# Patient Record
Sex: MALE | Race: WHITE | NOT HISPANIC OR LATINO | Employment: FULL TIME | URBAN - METROPOLITAN AREA
[De-identification: names, ages, dates, MRNs, and addresses within clinical notes are randomized per-mention and may not be internally consistent; named-entity substitution may affect disease eponyms.]

---

## 2021-04-12 ENCOUNTER — HOSPITAL ENCOUNTER (EMERGENCY)
Facility: HOSPITAL | Age: 61
End: 2021-04-13
Attending: EMERGENCY MEDICINE | Admitting: EMERGENCY MEDICINE
Payer: COMMERCIAL

## 2021-04-12 DIAGNOSIS — F32.A DEPRESSION: Primary | ICD-10-CM

## 2021-04-12 PROBLEM — F41.9 ANXIETY DISORDER, UNSPECIFIED: Status: ACTIVE | Noted: 2021-04-12

## 2021-04-12 LAB
ALBUMIN SERPL BCP-MCNC: 3.9 G/DL (ref 3–5.2)
ALP SERPL-CCNC: 38 U/L (ref 43–122)
ALT SERPL W P-5'-P-CCNC: 13 U/L
AMPHETAMINES SERPL QL SCN: NEGATIVE
ANION GAP SERPL CALCULATED.3IONS-SCNC: 5 MMOL/L (ref 5–14)
AST SERPL W P-5'-P-CCNC: 23 U/L (ref 17–59)
ATRIAL RATE: 66 BPM
BARBITURATES UR QL: NEGATIVE
BASOPHILS # BLD AUTO: 0 THOUSANDS/ΜL (ref 0–0.1)
BASOPHILS NFR BLD AUTO: 0 % (ref 0–1)
BENZODIAZ UR QL: NEGATIVE
BILIRUB SERPL-MCNC: 0.94 MG/DL
BUN SERPL-MCNC: 18 MG/DL (ref 5–25)
CALCIUM SERPL-MCNC: 9.2 MG/DL (ref 8.4–10.2)
CHLORIDE SERPL-SCNC: 104 MMOL/L (ref 97–108)
CO2 SERPL-SCNC: 30 MMOL/L (ref 22–30)
COCAINE UR QL: NEGATIVE
CREAT SERPL-MCNC: 0.72 MG/DL (ref 0.7–1.5)
EOSINOPHIL # BLD AUTO: 0 THOUSAND/ΜL (ref 0–0.4)
EOSINOPHIL NFR BLD AUTO: 1 % (ref 0–6)
ERYTHROCYTE [DISTWIDTH] IN BLOOD BY AUTOMATED COUNT: 12.6 %
ETHANOL EXG-MCNC: 0 MG/DL
FLUAV RNA RESP QL NAA+PROBE: NEGATIVE
FLUBV RNA RESP QL NAA+PROBE: NEGATIVE
GFR SERPL CREATININE-BSD FRML MDRD: 100 ML/MIN/1.73SQ M
GLUCOSE SERPL-MCNC: 100 MG/DL (ref 70–99)
HCT VFR BLD AUTO: 42.2 % (ref 41–53)
HGB BLD-MCNC: 14.2 G/DL (ref 13.5–17.5)
LYMPHOCYTES # BLD AUTO: 0.7 THOUSANDS/ΜL (ref 0.5–4)
LYMPHOCYTES NFR BLD AUTO: 15 % (ref 25–45)
MCH RBC QN AUTO: 33 PG (ref 26–34)
MCHC RBC AUTO-ENTMCNC: 33.6 G/DL (ref 31–36)
MCV RBC AUTO: 98 FL (ref 80–100)
METHADONE UR QL: NEGATIVE
MONOCYTES # BLD AUTO: 0.3 THOUSAND/ΜL (ref 0.2–0.9)
MONOCYTES NFR BLD AUTO: 6 % (ref 1–10)
NEUTROPHILS # BLD AUTO: 3.5 THOUSANDS/ΜL (ref 1.8–7.8)
NEUTS SEG NFR BLD AUTO: 78 % (ref 45–65)
OPIATES UR QL SCN: NEGATIVE
OXYCODONE+OXYMORPHONE UR QL SCN: NEGATIVE
P AXIS: 59 DEGREES
PCP UR QL: NEGATIVE
PLATELET # BLD AUTO: 222 THOUSANDS/UL (ref 150–450)
PMV BLD AUTO: 8.3 FL (ref 8.9–12.7)
POTASSIUM SERPL-SCNC: 3.9 MMOL/L (ref 3.6–5)
PR INTERVAL: 150 MS
PROT SERPL-MCNC: 6.6 G/DL (ref 5.9–8.4)
QRS AXIS: -49 DEGREES
QRSD INTERVAL: 94 MS
QT INTERVAL: 390 MS
QTC INTERVAL: 408 MS
RBC # BLD AUTO: 4.29 MILLION/UL (ref 4.5–5.9)
RSV RNA RESP QL NAA+PROBE: NEGATIVE
SARS-COV-2 RNA RESP QL NAA+PROBE: NEGATIVE
SODIUM SERPL-SCNC: 139 MMOL/L (ref 137–147)
T WAVE AXIS: -13 DEGREES
THC UR QL: NEGATIVE
TSH SERPL DL<=0.05 MIU/L-ACNC: 0.65 UIU/ML (ref 0.47–4.68)
VENTRICULAR RATE: 66 BPM
WBC # BLD AUTO: 4.5 THOUSAND/UL (ref 4.5–11)

## 2021-04-12 PROCEDURE — 93005 ELECTROCARDIOGRAM TRACING: CPT

## 2021-04-12 PROCEDURE — 82075 ASSAY OF BREATH ETHANOL: CPT | Performed by: EMERGENCY MEDICINE

## 2021-04-12 PROCEDURE — 80307 DRUG TEST PRSMV CHEM ANLYZR: CPT | Performed by: EMERGENCY MEDICINE

## 2021-04-12 PROCEDURE — 99285 EMERGENCY DEPT VISIT HI MDM: CPT

## 2021-04-12 PROCEDURE — 84443 ASSAY THYROID STIM HORMONE: CPT | Performed by: EMERGENCY MEDICINE

## 2021-04-12 PROCEDURE — 99285 EMERGENCY DEPT VISIT HI MDM: CPT | Performed by: EMERGENCY MEDICINE

## 2021-04-12 PROCEDURE — 80053 COMPREHEN METABOLIC PANEL: CPT | Performed by: EMERGENCY MEDICINE

## 2021-04-12 PROCEDURE — 93010 ELECTROCARDIOGRAM REPORT: CPT | Performed by: INTERNAL MEDICINE

## 2021-04-12 PROCEDURE — 99243 OFF/OP CNSLTJ NEW/EST LOW 30: CPT | Performed by: PSYCHIATRY & NEUROLOGY

## 2021-04-12 PROCEDURE — 85025 COMPLETE CBC W/AUTO DIFF WBC: CPT | Performed by: EMERGENCY MEDICINE

## 2021-04-12 PROCEDURE — 0241U HB NFCT DS VIR RESP RNA 4 TRGT: CPT | Performed by: EMERGENCY MEDICINE

## 2021-04-12 PROCEDURE — 36415 COLL VENOUS BLD VENIPUNCTURE: CPT | Performed by: EMERGENCY MEDICINE

## 2021-04-12 RX ORDER — SIMVASTATIN 40 MG
40 TABLET ORAL
Status: DISCONTINUED | OUTPATIENT
Start: 2021-04-12 | End: 2021-04-12

## 2021-04-12 RX ORDER — DIPHENHYDRAMINE HCL 25 MG
25 TABLET ORAL ONCE
Status: COMPLETED | OUTPATIENT
Start: 2021-04-12 | End: 2021-04-13

## 2021-04-12 RX ORDER — ATORVASTATIN CALCIUM 20 MG/1
20 TABLET, FILM COATED ORAL
Status: DISCONTINUED | OUTPATIENT
Start: 2021-04-13 | End: 2021-04-12

## 2021-04-12 RX ORDER — LISINOPRIL 10 MG/1
10 TABLET ORAL DAILY
Status: DISCONTINUED | OUTPATIENT
Start: 2021-04-12 | End: 2021-04-12

## 2021-04-12 RX ORDER — LISINOPRIL 10 MG/1
10 TABLET ORAL DAILY
COMMUNITY
Start: 2021-03-30

## 2021-04-12 RX ORDER — HYDROXYZINE HYDROCHLORIDE 25 MG/1
50 TABLET, FILM COATED ORAL ONCE
Status: DISCONTINUED | OUTPATIENT
Start: 2021-04-12 | End: 2021-04-12

## 2021-04-12 RX ORDER — LISINOPRIL 10 MG/1
10 TABLET ORAL DAILY
Status: DISCONTINUED | OUTPATIENT
Start: 2021-04-12 | End: 2021-04-13 | Stop reason: HOSPADM

## 2021-04-12 RX ORDER — LISINOPRIL 10 MG/1
10 TABLET ORAL DAILY
Status: DISCONTINUED | OUTPATIENT
Start: 2021-04-13 | End: 2021-04-12

## 2021-04-12 RX ORDER — SIMVASTATIN 40 MG
40 TABLET ORAL
COMMUNITY
Start: 2021-03-30 | End: 2022-03-30

## 2021-04-12 RX ORDER — LANOLIN ALCOHOL/MO/W.PET/CERES
6 CREAM (GRAM) TOPICAL
Status: DISCONTINUED | OUTPATIENT
Start: 2021-04-12 | End: 2021-04-13 | Stop reason: HOSPADM

## 2021-04-12 RX ORDER — ATORVASTATIN CALCIUM 20 MG/1
20 TABLET, FILM COATED ORAL ONCE
Status: COMPLETED | OUTPATIENT
Start: 2021-04-12 | End: 2021-04-12

## 2021-04-12 RX ORDER — SIMVASTATIN 40 MG
40 TABLET ORAL
Status: DISCONTINUED | OUTPATIENT
Start: 2021-04-13 | End: 2021-04-12

## 2021-04-12 RX ORDER — HYDROXYZINE HYDROCHLORIDE 25 MG/1
25 TABLET, FILM COATED ORAL EVERY 6 HOURS PRN
Status: DISCONTINUED | OUTPATIENT
Start: 2021-04-12 | End: 2021-04-13 | Stop reason: HOSPADM

## 2021-04-12 RX ORDER — ASPIRIN 81 MG/1
81 TABLET ORAL DAILY
Status: DISCONTINUED | OUTPATIENT
Start: 2021-04-13 | End: 2021-04-13 | Stop reason: HOSPADM

## 2021-04-12 RX ORDER — LORAZEPAM 0.5 MG/1
0.5 TABLET ORAL EVERY 6 HOURS PRN
Status: DISCONTINUED | OUTPATIENT
Start: 2021-04-12 | End: 2021-04-13 | Stop reason: HOSPADM

## 2021-04-12 RX ORDER — HYDROXYZINE HYDROCHLORIDE 25 MG/1
50 TABLET, FILM COATED ORAL
COMMUNITY

## 2021-04-12 RX ADMIN — ATORVASTATIN CALCIUM 20 MG: 20 TABLET, FILM COATED ORAL at 20:41

## 2021-04-12 RX ADMIN — LORAZEPAM 0.5 MG: 0.5 TABLET ORAL at 19:41

## 2021-04-12 NOTE — ED NOTES
Patients girlfriend went home, requested updates during his stay   Pt laying in bed quietly     Lyn Martinez RN  04/12/21 6333

## 2021-04-12 NOTE — ED NOTES
Patient brought into the ED by his wife  Per wife patient has not been acting himself over the past 3 months  She also states patient's work was concerned of the same  Per wife patient not as talkative, has been concerned about "everything"  States he is worried about his elderly father passing, follows his wife around the house, worried about work  Patient won't specify, questioning everything this RN is doing or explaining to him  Patient fixated on his signature and how it was "too big" for the line provided during his belongings  Patient will not keep his hands still, appears to be restless  Denies SI/HI/AH/VH  Patient was on Trazodone 150mg HS for sleep but this was stopped and changed approx  3 weeks ago  Patient now prescribed Hydroxyzine 50mg HS PRN for sleep  Per wife patient has been taking this every night, but it is not helping  States he is restless and not sleeping, today has been up since around 1am  Patient is cooperative  Q15min checks in place  Patient near nurses station               [facility]  04/12/21 1855

## 2021-04-12 NOTE — ED NOTES
There are no beds in network, [facility], [facility], [facility], Yolanda TELLEZ Tonyberg, Ozzie cavazoser, Charlie, Natan  [address] requested clinical be faxed for review

## 2021-04-12 NOTE — ED NOTES
Patient is a 57 yr old male brought to the Ed due to extreme anxiety, depression, excessive worry which is interferring with functioning at home and at work, paranoia, and inablility to sleep  Patient has no prior hx prior to about 3 months ago  Now he is having periods where he is unable to sleep, is up all night, is very restless, hitting or scratching his arms, rocking or pacing  He is fearful that his family is ok  mood is depressed, anxious  He is having difficulty functioning  Unable to identify a reason why the symptoms escalated about 3 mon  ago  Patient is ok with inpatient admission to improve his symptoms and a 201 was signed  Patient is cooperative in the ED, but is very anxious and is afraid to be alone, dosen't want his girlfriend to leave  Patient has no prior hx of inpatient treatment  He does not have a psychiatrist, and his PCP has tried to manage his symptoms       Josafat SULLIVAN

## 2021-04-12 NOTE — ED NOTES
COVID results faxed to [facility] per request      Benitez Franklin, NATE  04/12/21 1956

## 2021-04-12 NOTE — CONSULTS
Psychiatry Consultation Note   Dipika Almonte 57 y o  male MRN: [id number]  Unit/Bed#: ED 09 Encounter: [id number]    Assessment and Plan     Assessment:    Dipika Almonte is a 57 y o  male with no psychiatric history who is presenting with severe anxiety, inability to sleep, paranoia, and restlessness  Principal Problem:  1  Anxiety Disorder, unspecified ([address] )  a  Differential: Generalized Anxiety Disorder, Panic Disorder, Major Depressive Disorder    Active Problems:  1  Insomnia  2  Hypertension    Plan:   Risks, benefits and possible side effects of Medications:   Risks, benefits, and possible side effects of medications explained to patient and patient verbalizes understanding  1  Patient would benefit from voluntary inpatient admission for severe anxiety, paranoia,  Insomnia, and inability to care for self  201 form signed by patient  Crisis worker to begin bed search  2    Recommend following medications:   Atarax 25 mg Q6H PRN for mild anxiety; Ativan 0 5 mg Q6H PRN for moderate-severe anxiety;  3    Psychiatric Crisis ED Consultation Service will continue to see the patient daily while he is in the ED  Please reach out to us via [place] Text with any questions      HPI     Chief Complaint: "I am anxious"    History of Present Illness   Physician Requesting Consult: Elen Taylor DO  Reason for Consult / Principal Problem:     Dipika Almonte is a 57 y  o white employed male with no known psychiatric history who is brought by his girlfriend with severe anxiety, restlessness, paranoia, and inability to sleep  Of note, 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 which required 5-day inpatient stay and 2-months of IV antibiotics  Patient reports that for the past 3 months, he has suffered from poor sleep, constant worrying about multiple things in his life, and not able to properly take care of himself   Patient and girlfriend report that he has been sleeping approximately two days out of 7 days for the past 3 months  Whenever he sleeps, he gets approximately 2-4 hours and occasionally 7 hours of sleep  Patient reports that he experiences constant anxiety about "everything"  At baseline, his anxiety is approximately 7/10 in severity on a daily basis  He is worrying about work, worried about his father or girlfriend dying, and worries about how he is going to fall asleep  His worries keep him up at night and patient's boss at work has noticed that he has been making mistakes at work as well  Patient's girlfriend noted that since his anxiety symptoms started, he has been involuntarily losing his weight  She notes that for the past 2 5 weeks, patient lost approximately 10 lbs of weight even though he has had good appetite and has been eating the same amount of food daily  Furthermore, patient and his girlfriend noted that he has been extremely fidgety, constantly scratching his arms, tremors, twirling his hair, rocking, and pacing at home  These symptoms are worsened with increase in anxiety  Girlfriend notes that he has become fearful of staying by himself and wants to be constantly around her  Denies panic attacks, nightmares, flashbacks, SOB, palpitations, diaphoresis, diarrhea, nausea/vomiting, cold/heat sensitivity, headaches  On further assessment, patient reports his mood as "depressed and anxious"  He reports that in addition to sleeping difficulty, he has been experiencing anhedonia, hopelessness, decreased energy, concentration, psychomotor slowing and periodically agitation  These symptoms have been occurring for the past 3 months as well  Denies SI/HI/AVH  Denies manic/hypomanic symptoms recently or in the past  Denies use of illicit substances  Denies use of caffeine  He reports that he feels safe at home and at work and that nobody is after him   However, he notes that he has been more alert about his surroundings and sometimes he looks at strangers several times because they look suspicious  Psychiatric ROS and PMH     Psychiatric Review Of Systems:    sleep: no  appetite changes: no  weight changes: yes, 10 lbs for the last 2 5 week  energy/anergy: yes  interest/pleasure/anhedonia: no  somatic symptoms: yes  anxiety/panic: no  melba: no  guilty/hopeless: yes  self injurious behavior/risky behavior: no    Historical Information   Past Psychiatric History:    Inpatient: no prior inpatient psychiatric admissions     Outpatient: Has seen PCP for insomnia     Current Psychiatrist: does not have  Has seen a psychiatrist once as a teenager because "I thought I was espinoza, but apparently I was not  I love women"  Did not wish to elaborate further  Past Suicide attempts: denies  Past Violent behavior: denies  Past Psychiatric medication trial: Trazodone, Atarax    Substance Abuse History:  Social History     Tobacco History     Smoking Status  Former Smoker    Smokeless Tobacco Use  Never Used          Alcohol History     Alcohol Use Status  Not Currently          Drug Use     Drug Use Status  Not Asked          Sexual Activity     Sexually Active  Not Asked          Activities of Daily Living    Not Asked                 I have assessed this patient for substance use within the past 12 months    History of IP/OP rehabilitation program: denies  Smoking history: quit 9 years ago  Used to smoke 1 ppd for 39 years  Family Psychiatric History:   No known psychiatric history    Social History:  Education: high school diploma/GED  Learning Disabilities: denies  Marital history: single  Living arrangement, social support: patient lives at home with his girfriend and girlfriend's sister  Reports good support from both  No conflict at home  Occupational History: works at a [employer]  Functioning Relationships: good support system and good relationship with spouse or significant other     Other Pertinent History: no legal, traumatic, or  history    Traumatic History:     Abuse: denies  Other Traumatic Events: death of sister from cancer many years ago  Past Medical History:   Diagnosis Date    High cholesterol     Hypertension     Insomnia     Lyme disease        Mental Status Evaluation and Medical ROS     Medical Review Of Systems:  Review of Systems - Negative except as mentioned in HPI    Meds/Allergies   all current active meds have been reviewed, current meds:   Current Facility-Administered Medications   Medication Dose Route Frequency    hydrOXYzine HCL (ATARAX) tablet 25 mg  25 mg Oral Q6H PRN    LORazepam (ATIVAN) tablet 0 5 mg  0 5 mg Oral Q6H PRN    and PTA meds:   Prior to Admission Medications   Prescriptions Last Dose Informant Patient Reported? Taking?   hydrOXYzine HCL (ATARAX) 25 mg tablet 4/11/2021 at Unknown time Spouse/Significant Other Yes Yes   Sig: Take 50 mg by mouth daily at bedtime as needed for itching   lisinopril (ZESTRIL) 10 mg tablet 4/12/2021 at Unknown time  Yes Yes   Sig: Take 10 mg by mouth daily   simvastatin (ZOCOR) 40 mg tablet 4/11/2021 at Unknown time  Yes Yes   Sig: Take 40 mg by mouth daily with dinner      Facility-Administered Medications: None     No Known Allergies    Objective   Vital signs in last 24 hours:  Temp:  [97 5 °F (36 4 °C)] 97 5 °F (36 4 °C)  HR:  [80] 80  Resp:  [18] 18  BP: (148)/(76) 148/76    No intake or output data in the 24 hours ending 04/12/21 1216    Mental Status Evaluation:  Appearance:  age appropriate, tired appearing white male, dressed in hospital gown  NAD  Sitting up on the bed  Behavior:  restless and fidgety, hypervigilant  Otherwise cooperative with interview     Speech:  spontaneous with normal rate and volume   Mood:  Depressed and anxious   Affect:  constricted   Language: naming objects   Thought Process:  goal directed   Associations: intact associations   Thought Content:  no delusions or obsessions   Perceptual Disturbances: No AVH  Does not appear to be responding to internal stimuli  Does not appear distracted  Risk Potential: Suicidal Ideations none, Homicidal Ideations none and Potential for Aggression No   Sensorium:  person, place, time/date and situation   Memory:  recent and remote memory grossly intact   Cognition:  recent and remote memory grossly intact   Consciousness:  alert and awake    Attention: attention span appeared shorter than expected for age   Intellect: not examined   Fund of Knowledge: awareness of current events: Current President (Jennifer), Coronavirus pandemic   Insight:  limited   Judgment: limited   Muscle Strength and Tone: WNL   Gait/Station: normal gait/station   Motor Activity: no abnormal movements     Lab Results:   I have personally reviewed all pertinent laboratory/tests results    Most Recent Labs:   Lab Results   Component Value Date    WBC 4 50 04/12/2021    RBC 4 29 (L) 04/12/2021    HGB 14 2 04/12/2021    HCT 42 2 04/12/2021     04/12/2021    RDW 12 6 04/12/2021    NEUTROABS 3 50 04/12/2021    SODIUM 139 04/12/2021    K 3 9 04/12/2021     04/12/2021    CO2 30 04/12/2021    BUN 18 04/12/2021    CREATININE 0 72 04/12/2021    GLUC 100 (H) 04/12/2021    CALCIUM 9 2 04/12/2021    AST 23 04/12/2021    ALT 13 04/12/2021    ALKPHOS 38 (L) 04/12/2021    TP 6 6 04/12/2021    ALB 3 9 04/12/2021    TBILI 0 94 04/12/2021    ACM9OKBMTEIN 0 650 04/12/2021     Drug Screen:   Lab Results   Component Value Date    AMPMETHUR Negative 04/12/2021    BARBTUR Negative 04/12/2021    BDZUR Negative 04/12/2021    THCUR Negative 04/12/2021    COCAINEUR Negative 04/12/2021    METHADONEUR Negative 04/12/2021    OPIATEUR Negative 04/12/2021    PCPUR Negative 04/12/2021         Millie Stanton MD    Psychiatry Resident, PGY-I

## 2021-04-12 NOTE — ED NOTES
Patient continued to pace in his room for 20 min and continuously ask this RN if his girlfriend is here  Let pt know she is speaking w/ staff  Gave pt water   Girlfriend now arrived at bedside w/ patient     Alistair Whelan RN  04/12/21 5720

## 2021-04-12 NOTE — ED NOTES
Psych resident at bedside speaking with patient and patients wife        [place]  04/12/21 7377

## 2021-04-12 NOTE — ED NOTES
pts girlfriend at bedside, pt given a blanket and oriented to television remote     Robson Dominguez RN  04/12/21 0517

## 2021-04-12 NOTE — ED PROVIDER NOTES
History  Chief Complaint   Patient presents with    Psychiatric Evaluation     sig other states about 2 weeks of strange paranoid/worried behavior  states doesnt sleep   "always thinking of death" but states doesnt want to die     Patient is a 68-year-old male who has a history of hypertension high cholesterol insomnia and Lyme disease  Presents for concerns of worsening depression and paranoid behavior  Presents with his fiancee  Patient states he has been having at 1st difficulty sleeping where he would only sleep once or twice a week  Now having hypersomnia  Also having paranoid behavior where he is following his fiancee around the house  He is also having auditory and visual hallucinations were telling him to go and breathe on his father  He is denying any suicidal homicidal ideations  But he is concerned about his behavior  Prior to Admission Medications   Prescriptions Last Dose Informant Patient Reported? Taking?   hydrOXYzine HCL (ATARAX) 25 mg tablet 4/11/2021 at Unknown time Spouse/Significant Other Yes Yes   Sig: Take 50 mg by mouth daily at bedtime as needed for itching   lisinopril (ZESTRIL) 10 mg tablet 4/12/2021 at Unknown time  Yes Yes   Sig: Take 10 mg by mouth daily   simvastatin (ZOCOR) 40 mg tablet 4/11/2021 at Unknown time  Yes Yes   Sig: Take 40 mg by mouth daily with dinner      Facility-Administered Medications: None       Past Medical History:   Diagnosis Date    High cholesterol     Hypertension     Insomnia     Lyme disease        Past Surgical History:   Procedure Laterality Date    KNEE SURGERY         History reviewed  No pertinent family history  I have reviewed and agree with the history as documented      E-Cigarette/Vaping     E-Cigarette/Vaping Substances     Social History     Tobacco Use    Smoking status: Former Smoker    Smokeless tobacco: Never Used   Substance Use Topics    Alcohol use: Not Currently    Drug use: Not on file       Review of Systems Constitutional: Negative  Negative for chills and fever  HENT: Negative  Negative for rhinorrhea, sore throat, trouble swallowing and voice change  Eyes: Negative  Negative for pain and visual disturbance  Respiratory: Negative  Negative for cough, shortness of breath and wheezing  Cardiovascular: Negative  Negative for chest pain and palpitations  Gastrointestinal: Negative for abdominal pain, diarrhea, nausea and vomiting  Genitourinary: Negative  Negative for dysuria and frequency  Musculoskeletal: Negative  Negative for neck pain and neck stiffness  Skin: Negative  Negative for rash  Neurological: Negative  Negative for dizziness, speech difficulty, weakness, light-headedness and numbness  Psychiatric/Behavioral: Positive for hallucinations  Negative for suicidal ideas  Physical Exam  Physical Exam  Vitals signs and nursing note reviewed  Constitutional:       General: He is not in acute distress  Appearance: He is well-developed  HENT:      Head: Normocephalic and atraumatic  Eyes:      Conjunctiva/sclera: Conjunctivae normal       Pupils: Pupils are equal, round, and reactive to light  Neck:      Musculoskeletal: Normal range of motion and neck supple  Trachea: No tracheal deviation  Cardiovascular:      Rate and Rhythm: Normal rate and regular rhythm  Pulmonary:      Effort: Pulmonary effort is normal  No respiratory distress  Breath sounds: Normal breath sounds  No wheezing or rales  Abdominal:      General: Bowel sounds are normal  There is no distension  Palpations: Abdomen is soft  Tenderness: There is no abdominal tenderness  There is no guarding or rebound  Musculoskeletal: Normal range of motion  General: No tenderness or deformity  Skin:     General: Skin is warm and dry  Capillary Refill: Capillary refill takes less than 2 seconds  Findings: No rash     Neurological:      Mental Status: He is alert and oriented to person, place, and time  GCS: GCS eye subscore is 4  GCS verbal subscore is 5  GCS motor subscore is 6  Cranial Nerves: Cranial nerves are intact  Sensory: Sensation is intact  Motor: Motor function is intact  Coordination: Coordination is intact  Gait: Gait is intact  Psychiatric:         Attention and Perception: Attention normal          Mood and Affect: Affect is flat  Speech: He is noncommunicative  Behavior: Behavior is withdrawn  Behavior is cooperative  Thought Content: Thought content is paranoid  Thought content is not delusional  Thought content does not include homicidal or suicidal ideation  Vital Signs  ED Triage Vitals [04/12/21 0849]   Temperature Pulse Respirations Blood Pressure SpO2   97 5 °F (36 4 °C) 80 18 148/76 99 %      Temp Source Heart Rate Source Patient Position - Orthostatic VS BP Location FiO2 (%)   Tympanic Monitor Sitting Left arm --      Pain Score       --           Vitals:    04/12/21 0849   BP: 148/76   Pulse: 80   Patient Position - Orthostatic VS: Sitting         Visual Acuity      ED Medications  Medications   hydrOXYzine HCL (ATARAX) tablet 25 mg (has no administration in time range)   LORazepam (ATIVAN) tablet 0 5 mg (has no administration in time range)       Diagnostic Studies  Results Reviewed     Procedure Component Value Units Date/Time    TSH [[id number]]  (Normal) Collected: 04/12/21 1008    Lab Status: Final result Specimen: Blood from Arm, Left Updated: 04/12/21 1106     TSH 3RD GENERATON 0 650 uIU/mL     Narrative:      Patients undergoing fluorescein dye angiography may retain small amounts of fluorescein in the body for 48-72 hours post procedure  Samples containing fluorescein can produce falsely depressed TSH values  If the patient had this procedure,a specimen should be resubmitted post fluorescein clearance        Rapid drug screen, urine [[id number]]  (Normal) Collected: 04/12/21 1008 Lab Status: Final result Specimen: Urine, Clean Catch Updated: 04/12/21 1043     Amph/Meth UR Negative     Barbiturate Ur Negative     Benzodiazepine Urine Negative     Cocaine Urine Negative     Methadone Urine Negative     Opiate Urine Negative     PCP Ur Negative     THC Urine Negative     Oxycodone Urine Negative    Narrative:      FOR MEDICAL PURPOSES ONLY  IF CONFIRMATION NEEDED PLEASE CONTACT THE LAB WITHIN 5 DAYS      Drug Screen Cutoff Levels:  AMPHETAMINE/METHAMPHETAMINES  1000 ng/mL  BARBITURATES     200 ng/mL  BENZODIAZEPINES     200 ng/mL  COCAINE      300 ng/mL  METHADONE      300 ng/mL  OPIATES      300 ng/mL  PHENCYCLIDINE     25 ng/mL  THC       50 ng/mL  OXYCODONE      100 ng/mL    Comprehensive metabolic panel [[id number]]  (Abnormal) Collected: 04/12/21 1008    Lab Status: Final result Specimen: Blood from Arm, Left Updated: 04/12/21 1037     Sodium 139 mmol/L      Potassium 3 9 mmol/L      Chloride 104 mmol/L      CO2 30 mmol/L      ANION GAP 5 mmol/L      BUN 18 mg/dL      Creatinine 0 72 mg/dL      Glucose 100 mg/dL      Calcium 9 2 mg/dL      AST 23 U/L      ALT 13 U/L      Alkaline Phosphatase 38 U/L      Total Protein 6 6 g/dL      Albumin 3 9 g/dL      Total Bilirubin 0 94 mg/dL      eGFR 100 ml/min/1 73sq m     Narrative:      National Kidney Disease Foundation guidelines for Chronic Kidney Disease (CKD):     Stage 1 with normal or high GFR (GFR > 90 mL/min/1 73 square meters)    Stage 2 Mild CKD (GFR = 60-89 mL/min/1 73 square meters)    Stage 3A Moderate CKD (GFR = 45-59 mL/min/1 73 square meters)    Stage 3B Moderate CKD (GFR = 30-44 mL/min/1 73 square meters)    Stage 4 Severe CKD (GFR = 15-29 mL/min/1 73 square meters)    Stage 5 End Stage CKD (GFR <15 mL/min/1 73 square meters)  Note: GFR calculation is accurate only with a steady state creatinine    CBC and differential [[id number]]  (Abnormal) Collected: 04/12/21 1008    Lab Status: Final result Specimen: Blood from Arm, Left Updated: 04/12/21 1029     WBC 4 50 Thousand/uL      RBC 4 29 Million/uL      Hemoglobin 14 2 g/dL      Hematocrit 42 2 %      MCV 98 fL      MCH 33 0 pg      MCHC 33 6 g/dL      RDW 12 6 %      MPV 8 3 fL      Platelets 015 Thousands/uL      Neutrophils Relative 78 %      Lymphocytes Relative 15 %      Monocytes Relative 6 %      Eosinophils Relative 1 %      Basophils Relative 0 %      Neutrophils Absolute 3 50 Thousands/µL      Lymphocytes Absolute 0 70 Thousands/µL      Monocytes Absolute 0 30 Thousand/µL      Eosinophils Absolute 0 00 Thousand/µL      Basophils Absolute 0 00 Thousands/µL     POCT alcohol breath test [[id number]]  (Normal) Resulted: 04/12/21 1007    Lab Status: Final result Updated: 04/12/21 1007     EXTBreath Alcohol 0 000                 No orders to display              Procedures  Procedures         ED Course  ED Course as of Apr 12 1405   Mon Apr 12, 2021   1028 Procedure Note: EKG  Date/Time: 04/12/21 10:28 AM   Performed by: Beka Jones  Authorized by: Beka Jones  ECG interpreted by me, the ED Provider: yes   The EKG demonstrates:  Rate 66  Rhythm sinus  QTc 408  No ST elevations/depressions                                    SBIRT 22yo+      Most Recent Value   SBIRT (25 yo +)   In order to provide better care to our patients, we are screening all of our patients for alcohol and drug use  Would it be okay to ask you these screening questions? No Filed at: 04/12/2021 1043                    MDM  Number of Diagnoses or Management Options  Depression:   Diagnosis management comments: Patient agreeable for further treatment evaluation as inpatient  Patient has been medically cleared by me  Two hundred one signed completed  Patient is stable for admission for psychiatric treatment        Disposition  Final diagnoses:   Depression     Time reflects when diagnosis was documented in both MDM as applicable and the Disposition within this note     Time User Action Codes Description Comment 4/12/2021  9:51 AM Dora Quivers Add [F32 9] Depression       ED Disposition     ED Disposition Condition Date/Time Comment    Transfer to [facility] Apr 12, 2021 ZANE Phelps 52 should be transferred out to [facility] and has been medically cleared  MD Documentation      Most Recent Value   Sending MD Dr Lillian Pagan    None         Patient's Medications   Discharge Prescriptions    No medications on file     No discharge procedures on file      PDMP Review     None          ED Provider  Electronically Signed by           Nevin Freeman DO  04/12/21 1703

## 2021-04-12 NOTE — ED NOTES
Pt paces around the room and constantly comes to the door repeating the same questions   Appears to be very restless      Deandre Yanes RN  04/12/21 1944

## 2021-04-13 VITALS
RESPIRATION RATE: 16 BRPM | SYSTOLIC BLOOD PRESSURE: 130 MMHG | DIASTOLIC BLOOD PRESSURE: 86 MMHG | TEMPERATURE: 97 F | OXYGEN SATURATION: 99 % | WEIGHT: 178 LBS | HEART RATE: 84 BPM

## 2021-04-13 RX ADMIN — LISINOPRIL 10 MG: 10 TABLET ORAL at 08:21

## 2021-04-13 RX ADMIN — MELATONIN TAB 3 MG 6 MG: 3 TAB at 00:06

## 2021-04-13 RX ADMIN — DIPHENHYDRAMINE HCL 25 MG: 25 TABLET ORAL at 00:06

## 2021-04-13 RX ADMIN — ASPIRIN 81 MG: 81 TABLET, COATED ORAL at 08:21

## 2021-04-13 NOTE — ED NOTES
Pt resting on stretcher with no signs of distress or discomfort  Resp even and unlabored  Pt is being observed 1:1 to maintain pt safety  This nurse to continue monitoring       Maurilio Hill RN  04/13/21 0152

## 2021-04-13 NOTE — ED NOTES
Pt resting on stretcher with no signs of distress or discomfort  Resp even and unlabored  Pt is being observed 1:1 to maintain pt safety  This nurse to continue monitoring       Jose Daniel Sauceda RN  04/13/21 8828

## 2021-04-13 NOTE — ED NOTES
Pt resting with no signs of distress or discomfort  Resp even and unlabored  Pt is being observed Q15 minutes to maintain pt safety  This nurse to continue monitoring        Louis Wright RN  04/13/21 0263

## 2021-04-13 NOTE — ED NOTES
Pt requested something to help him sleep  This nurse notified Dr Maritza Pond and orders were received  This nurse to request Melatonin via CYBRA from nursing supervisor  This nurse brought pt ginger ale with ice and peanut butter crackers             Jessica Fuller RN  04/12/21 1944

## 2021-04-13 NOTE — ED NOTES
Patient is accepted at [facility]  Patient is accepted by Dr Maxime Downing per [place] in Admissions  Transportation is arranged with [facility]  Transportation is scheduled for 0930  Patient may go to the floor after 10am           Nurse report is not needed       NATE Casillas  04/12/21   1235

## 2021-04-13 NOTE — ED NOTES
Call placed to [facility], to complete precert, spoke with  who took patient's information and states someone should return call in about 30 minutes to complete authorization      NATE Barnard  04/12/21 2039

## 2021-04-13 NOTE — ED NOTES
Pt resting with no signs of distress or discomfort  Resp even and unlabored  Pt is being observed Q15 minutes to maintain pt safety  This nurse to continue monitoring        Brenda Grewal RN  04/13/21 1010

## 2021-04-13 NOTE — ED NOTES
This nurse called [facility] to verify  time for pt transport to [address] [place] [address]  Per [facility] is to be transferred at 0930 tomorrow        Daniela Akers RN  04/12/21 3960

## 2021-04-13 NOTE — ED NOTES
Pt resting on stretcher with no signs of distress or discomfort  Resp even and unlabored  Pt is being observed 1:1 to maintain pt safety  This nurse to continue monitoring        Charis Langford RN  04/13/21 9484

## 2021-04-13 NOTE — ED NOTES
Pt laying on stretcher watching tv  There are no signs of distress or discomfort  Resp even and unlabored  Pt has PO fluids at bedside  Pt states that he does not need anything at this time  Pt's room remains a safe environment for the care of a [facility] pt  Pt within view of nurse's station  This nurse to continue monitoring        Lonny Olivares RN  04/13/21 3041

## 2021-04-13 NOTE — ED NOTES
Patient was accepted to [facility] for a discharge bed and will be picked up at 0930 by [facility]

## 2021-04-13 NOTE — ED NOTES
Insurance Authorization for admission:   Phone call placed to [facility]  Phone number: [phone]  Spoke to Alice      3 days approved  Level of care: Acute Inpt  (201)  Review on 04/16/2021  Authorization # [id number]  EVS (Eligibility Verification System) called - [phone]  Automated system indicates: Not on file with [facility] for Transportation:    Not required for [facility] transport  [facility] contacted and informed of NATE Rodriguez  04/12/21   0284

## 2021-04-13 NOTE — ED NOTES
Pt frequently asking questions, given phone, oral care completed     Chalo Goncalves RN  04/13/21 6232

## 2021-04-13 NOTE — EMTALA/ACUTE CARE TRANSFER
[facility] EMERGENCY DEPARTMENT  [address] [facility] [address]  [phone]  Dept: [phone]      EMTALA TRANSFER CONSENT    NAME Rebecca Rey                                         1960                              MRN [id number]    I have been informed of my rights regarding examination, treatment, and transfer   by Dr Geeta Sifuentes DO    Benefits: Specialized equipment and/or services available at the receiving facility (Include comment)________________________(psychiatric)    Risks: Potential for delay in receiving treatment      Consent for Transfer:  I acknowledge that my medical condition has been evaluated and explained to me by the emergency department physician or other qualified medical person and/or my attending physician, who has recommended that I be transferred to the service of  Accepting Physician: Ladonna Cardoso at [address], [address] : [place], [place]  The above potential benefits of such transfer, the potential risks associated with such transfer, and the probable risks of not being transferred have been explained to me, and I fully understand them  The doctor has explained that, in my case, the benefits of transfer outweigh the risks  I agree to be transferred  I authorize the performance of emergency medical procedures and treatments upon me in both transit and upon arrival at the receiving facility  Additionally, I authorize the release of any and all medical records to the receiving facility and request they be transported with me, if possible  I understand that the safest mode of transportation during a medical emergency is an ambulance and that the Hospital advocates the use of this mode of transport   Risks of traveling to the receiving facility by car, including absence of medical control, life sustaining equipment, such as oxygen, and medical personnel has been explained to me and I fully understand them     ([address])  [  ]  I consent to the stated transfer and to be transported by ambulance/helicopter  [  ]  I consent to the stated transfer, but refuse transportation by ambulance and accept full responsibility for my transportation by car  I understand the risks of non-ambulance transfers and I exonerate the Hospital and its staff from any deterioration in my condition that results from this refusal     X___________________________________________    DATE  21  TIME________  Signature of patient or legally responsible individual signing on patient behalf           RELATIONSHIP TO PATIENT_________________________          Provider Certification    NAME Cassidy Najera                                        AMY 1960                              MRN [id number]    A medical screening exam was performed on the above named patient  Based on the examination:    Condition Necessitating Transfer The encounter diagnosis was Depression  Patient Condition: The patient has been stabilized such that within reasonable medical probability, no material deterioration of the patient condition or the condition of the unborn child(volodymyr) is likely to result from the transfer    Reason for Transfer: No bed available at level of patient's needs    Transfer Requirements: [address] [place] [address]   · Space available and qualified personnel available for treatment as acknowledged by Aicha Drew, [phone]  · Agreed to accept transfer and to provide appropriate medical treatment as acknowledged by       Papo Yanez  · Appropriate medical records of the examination and treatment of the patient are provided at the time of transfer   [facility], [address] _______  · Transfer will be performed by qualified personnel from [address] [place] [address]  and appropriate transfer equipment as required, including the use of necessary and appropriate life support measures      Provider Certification: I have examined the patient and explained the following risks and benefits of being transferred/refusing transfer to the patient/family:  The patient is stable for psychiatric transfer because they are medically stable, and is protected from harming him/herself or others during transport      Based on these reasonable risks and benefits to the patient and/or the unborn child(volodymyr), and based upon the information available at the time of the patients examination, I certify that the medical benefits reasonably to be expected from the provision of appropriate medical treatments at another medical facility outweigh the increasing risks, if any, to the individuals medical condition, and in the case of labor to the unborn child, from effecting the transfer      X____________________________________________ DATE 04/12/21        TIME_______      ORIGINAL - SEND TO MEDICAL RECORDS   COPY - SEND WITH PATIENT DURING TRANSFER

## 2021-04-13 NOTE — ED NOTES
Pt came out of room and requested to use phone to call his father  Pt reports that he normally calls his father at this time  Pt also requested Lisinopril because he normally takes it at this time  This nurse notified Dr Robson Steinberg, per provider VS are not within range to administer medication that pt requested  This nurse to continue monitoring        Shmuel Guzman RN  04/13/21 4905

## 2021-04-13 NOTE — ED NOTES
CTS at bedside for transfer to [facility] marcial VILLALTA with no complaints on transfer     Abebe Lopez RN  04/13/21 9799

## 2021-04-26 ENCOUNTER — HOSPITAL ENCOUNTER (EMERGENCY)
Facility: HOSPITAL | Age: 61
End: 2021-04-27
Attending: EMERGENCY MEDICINE | Admitting: EMERGENCY MEDICINE
Payer: COMMERCIAL

## 2021-04-26 DIAGNOSIS — F32.A DEPRESSION: ICD-10-CM

## 2021-04-26 DIAGNOSIS — Z00.8 ENCOUNTER FOR PSYCHOLOGICAL EVALUATION: Primary | ICD-10-CM

## 2021-04-26 PROBLEM — G47.00 INSOMNIA: Status: ACTIVE | Noted: 2021-04-26

## 2021-04-26 LAB
ALBUMIN SERPL BCP-MCNC: 4 G/DL (ref 3–5.2)
ALP SERPL-CCNC: 41 U/L (ref 43–122)
ALT SERPL W P-5'-P-CCNC: 20 U/L
AMPHETAMINES SERPL QL SCN: NEGATIVE
ANION GAP SERPL CALCULATED.3IONS-SCNC: 5 MMOL/L (ref 5–14)
AST SERPL W P-5'-P-CCNC: 24 U/L (ref 17–59)
BARBITURATES UR QL: NEGATIVE
BASOPHILS # BLD AUTO: 0.05 THOUSAND/UL (ref 0–0.1)
BASOPHILS NFR MAR MANUAL: 1 % (ref 0–1)
BENZODIAZ UR QL: NEGATIVE
BILIRUB SERPL-MCNC: 0.79 MG/DL
BUN SERPL-MCNC: 15 MG/DL (ref 5–25)
CALCIUM SERPL-MCNC: 9.3 MG/DL (ref 8.4–10.2)
CHLORIDE SERPL-SCNC: 102 MMOL/L (ref 97–108)
CO2 SERPL-SCNC: 32 MMOL/L (ref 22–30)
COCAINE UR QL: NEGATIVE
CREAT SERPL-MCNC: 0.83 MG/DL (ref 0.7–1.5)
EOSINOPHIL # BLD AUTO: 0.09 THOUSAND/UL (ref 0–0.4)
EOSINOPHIL NFR BLD MANUAL: 2 % (ref 0–6)
ERYTHROCYTE [DISTWIDTH] IN BLOOD BY AUTOMATED COUNT: 13 %
ETHANOL EXG-MCNC: 0 MG/DL
GFR SERPL CREATININE-BSD FRML MDRD: 94 ML/MIN/1.73SQ M
GLUCOSE SERPL-MCNC: 77 MG/DL (ref 70–99)
HCT VFR BLD AUTO: 41.2 % (ref 41–53)
HGB BLD-MCNC: 13.7 G/DL (ref 13.5–17.5)
LG PLATELETS BLD QL SMEAR: PRESENT
LYMPHOCYTES # BLD AUTO: 0.77 THOUSAND/UL (ref 0.5–4)
LYMPHOCYTES # BLD AUTO: 17 % (ref 25–45)
MCH RBC QN AUTO: 32.5 PG (ref 26–34)
MCHC RBC AUTO-ENTMCNC: 33.4 G/DL (ref 31–36)
MCV RBC AUTO: 97 FL (ref 80–100)
METHADONE UR QL: NEGATIVE
MONOCYTES # BLD AUTO: 0.41 THOUSAND/UL (ref 0.2–0.9)
MONOCYTES NFR BLD AUTO: 9 % (ref 1–10)
NEUTS SEG # BLD: 3.2 THOUSAND/UL (ref 1.8–7.8)
NEUTS SEG NFR BLD AUTO: 71 %
OPIATES UR QL SCN: NEGATIVE
OXYCODONE+OXYMORPHONE UR QL SCN: NEGATIVE
PCP UR QL: NEGATIVE
PLATELET # BLD AUTO: 239 THOUSANDS/UL (ref 150–450)
PLATELET BLD QL SMEAR: ADEQUATE
PMV BLD AUTO: 7.9 FL (ref 8.9–12.7)
POTASSIUM SERPL-SCNC: 4.1 MMOL/L (ref 3.6–5)
PROT SERPL-MCNC: 6.7 G/DL (ref 5.9–8.4)
RBC # BLD AUTO: 4.22 MILLION/UL (ref 4.5–5.9)
RBC MORPH BLD: NORMAL
SODIUM SERPL-SCNC: 139 MMOL/L (ref 137–147)
THC UR QL: NEGATIVE
TOTAL CELLS COUNTED SPEC: 100
TSH SERPL DL<=0.05 MIU/L-ACNC: 1.66 UIU/ML (ref 0.47–4.68)
WBC # BLD AUTO: 4.5 THOUSAND/UL (ref 4.5–11)

## 2021-04-26 PROCEDURE — 80307 DRUG TEST PRSMV CHEM ANLYZR: CPT | Performed by: EMERGENCY MEDICINE

## 2021-04-26 PROCEDURE — 80053 COMPREHEN METABOLIC PANEL: CPT | Performed by: EMERGENCY MEDICINE

## 2021-04-26 PROCEDURE — 85027 COMPLETE CBC AUTOMATED: CPT | Performed by: EMERGENCY MEDICINE

## 2021-04-26 PROCEDURE — 99284 EMERGENCY DEPT VISIT MOD MDM: CPT | Performed by: PSYCHIATRY & NEUROLOGY

## 2021-04-26 PROCEDURE — 84443 ASSAY THYROID STIM HORMONE: CPT | Performed by: EMERGENCY MEDICINE

## 2021-04-26 PROCEDURE — 85007 BL SMEAR W/DIFF WBC COUNT: CPT | Performed by: EMERGENCY MEDICINE

## 2021-04-26 PROCEDURE — 93005 ELECTROCARDIOGRAM TRACING: CPT

## 2021-04-26 PROCEDURE — 99285 EMERGENCY DEPT VISIT HI MDM: CPT

## 2021-04-26 PROCEDURE — 36415 COLL VENOUS BLD VENIPUNCTURE: CPT | Performed by: EMERGENCY MEDICINE

## 2021-04-26 PROCEDURE — 82075 ASSAY OF BREATH ETHANOL: CPT | Performed by: EMERGENCY MEDICINE

## 2021-04-26 PROCEDURE — 99284 EMERGENCY DEPT VISIT MOD MDM: CPT | Performed by: EMERGENCY MEDICINE

## 2021-04-26 RX ORDER — MIRTAZAPINE 15 MG/1
15 TABLET, FILM COATED ORAL
Status: DISCONTINUED | OUTPATIENT
Start: 2021-04-26 | End: 2021-04-27 | Stop reason: HOSPADM

## 2021-04-26 RX ORDER — DIPHENHYDRAMINE HCL 25 MG
50 TABLET ORAL ONCE
Status: COMPLETED | OUTPATIENT
Start: 2021-04-26 | End: 2021-04-27

## 2021-04-26 RX ORDER — HYDROXYZINE HYDROCHLORIDE 25 MG/1
25 TABLET, FILM COATED ORAL 2 TIMES DAILY
Status: DISCONTINUED | OUTPATIENT
Start: 2021-04-26 | End: 2021-04-27 | Stop reason: HOSPADM

## 2021-04-26 RX ORDER — TRAZODONE HYDROCHLORIDE 100 MG/1
150 TABLET ORAL ONCE
Status: COMPLETED | OUTPATIENT
Start: 2021-04-26 | End: 2021-04-27

## 2021-04-26 RX ADMIN — MIRTAZAPINE 15 MG: 15 TABLET, FILM COATED ORAL at 22:16

## 2021-04-26 RX ADMIN — HYDROXYZINE HYDROCHLORIDE 25 MG: 25 TABLET, FILM COATED ORAL at 18:27

## 2021-04-26 NOTE — ED NOTES
Patient on phone with his father  No signs or symptoms of distress are noted        Cynthia Landau, RN  04/26/21 1046

## 2021-04-26 NOTE — ED NOTES
Patient remains cooperative  No signs or symptoms of distress are noted               Lux Weiner RN  04/26/21 7202

## 2021-04-26 NOTE — ED NOTES
Patient remains anxious, see MAR for meds  Patient states he feels he wants to sign the 201 at this time  Rosanna from crisis informed  Patient has remains cooperative, no signs or symptoms of distress are noted  Q7min checks remain in place        Abebe Mojica RN  04/26/21 8911

## 2021-04-26 NOTE — ED PROCEDURE NOTE
PROCEDURE  ECG 12 Lead Documentation Only    Date/Time: 4/26/2021 3:43 PM  Performed by: Maribel Vargas DO  Authorized by: Maribel Vargas DO     ECG reviewed by me, the ED Provider: yes    Patient location:  ED  Previous ECG:     Previous ECG:  Compared to current    Similarity:  No change  Interpretation:     Interpretation: normal    Rate:     ECG rate assessment: normal    Rhythm:     Rhythm: sinus rhythm    Ectopy:     Ectopy: none    QRS:     QRS axis:  Normal    QRS intervals:  Normal  Conduction:     Conduction: normal    ST segments:     ST segments:  Normal  T waves:     T waves: normal           Maribel Vargas DO  04/26/21 1543

## 2021-04-26 NOTE — ED NOTES
Pt resting on stretcher, no visible signs of distress or pain noted  Given blanket as per requested  Q7min checks continued        Rock Leisa RN  04/26/21 1200

## 2021-04-26 NOTE — ED NOTES
Patient eating dinner  Psych and crisis talking with patient to sign a 201        Carey Us RN  04/26/21 4629

## 2021-04-26 NOTE — CONSULTS
Psychiatry Consultation Note   Walker Andrade 57 y o  male MRN: [id number]  Unit/Bed#: ED 10 Encounter: [id number]    Assessment and Plan     Assessment   Principal Problem:    Anxiety disorder, unspecified  Active Problems:    Insomnia    Plan     Assessment:    Walker Andrade is a 57 y o  male with unspecified anxiety disorder who is presenting with severe anxiety and insomnia  Does not meet time-length criteria for BALDEMAR diagnosis  Principal Problem:  1  Anxiety disorder, unspecified ([address] )  a  Differential:  Generalized anxiety disorder, panic disorder, major depressive disorder    Active Problems:  1  Insomnia  2  Hypertension    Plan:   1  Recommend voluntary psychiatric admission  201 form offered for patient, but patient requested some time to consider  Crisis worker and ED attending aware  2    Medications reviewed  Medication recommendations:    Increase sertraline from 100 mg to 150 mg daily starting tomorrow  Start Atarax 25 mg b i d  for anxiety control    Continue mirtazapine 15 mg q h s   3   Psychiatric ED crisis service will continue seen the patient daily in the emergency department  Please reach out via tiger text during regular hours with any questions  HPI     Chief Complaint: "I don't know what to do"    History of Present Illness   Physician Requesting Consult: Shashi Vitale DO  Reason for Consult / Principal Problem: "psych"    Walker Andrade is a 57 y o  male with anxiety disorder who presents with anxiety and difficulty with falling asleep  Patient is familiar from prior consultation on 4/12/21 for similar presentation  Pt was admitted to [facility] inpatient unit and was discharged on 4/21/21  According to patient and alexe, patient was feeling well for two days after discharge, and starting on Friday, started to experience anxiety symptoms   He was having racing thoughts, worrying about multiple things in his life, feeling on edge, and unable to fall asleep  He stayed up all night on Friday with "too many thoughts in my head" but was able to sleep on Saturday  Last night, he was again unable to fall asleep until 12:30 AM  Furthermore, constant worries have been interfering with daily functioning and patient has been unable to care for himself properly  He has been frequently anxious about safety of his fiancee and checking up on her frequently and following her everywhere she went  He is also concerned about his father's safety as he is 80years old and he is afraid he might die  Furthermore, he was supposed to go back to work last week but he was too anxious to return  Patient's fiancee was concerned and called patient's PCP who told them to come to ED for possible inpatient admission  At the time of consultation, patient was visibly anxious and restless  He said that much of his anxiety is centered around his sexual experimentation with same-sex peers at the age of 14  He also mentioned that he was "hybernating as espinoza" and had another experience at the age of 15 but said that he didn't have any feelings towards men  He notes that at times he has been looking at men's buttocks but says that he is not attracted to men  He further states that he has been with his female partner for the last 34 years and now all of those thoughts and feelings about his past are resurfacing and causing severe anxiety  He denies feeling depressed currently  He denies suicidal ideation, homicidal ideation, auditory or visual hallucinations  Although it was noted that the patient mentioned that he wants to go to sleep and not wake up, he mentions that in the context of anxiety and insomnia and that he does not have suicidal thoughts or thoughts of self-harm  Patient is scheduled to see a psychotherapist tomorrow on 4/27/2021 for the first time  He is currently on sertraline 100 mg and mirtazapine 15 mg after last hospitalization       [facility]     Psychiatric Review Of Systems:    sleep: yes  appetite changes: no  weight changes: yes  energy/anergy: yes, decreased  interest/pleasure/anhedonia: no  somatic symptoms: yes  anxiety/panic: yes  melba: no  guilty/hopeless: no  self injurious behavior/risky behavior: no    Historical Information   Past Psychiatric History:    Inpatient: One psychiatric admission at [facility], discharged on 4/21/2021  No other psychiatric admissions  Has seen a psychiatrist first time as a teenager because he thought he was "espinoza"  Outpatient: Has a psychotherapist appointment tomorrow for first time  Past Suicide attempts: denies  Past Violent behavior: denies  Past Psychiatric medication trial: Trazodone, Atarax    Substance Abuse History:  Social History     Tobacco History     Smoking Status  Former Smoker    Smokeless Tobacco Use  Never Used          Alcohol History     Alcohol Use Status  Not Currently          Drug Use     Drug Use Status  Never          Sexual Activity     Sexually Active  Not Asked          Activities of Daily Living    Not Asked                 I have assessed this patient for substance use within the past 12 months    History of IP/OP rehabilitation program: denies  Smoking history: Quit 9 years ago  Used to smoke 1 ppd for 39 years  Family Psychiatric History:   No known psychiatric history    Social History:  Education: high school diploma/GED  Learning Disabilities: denies  Marital history: single  Living arrangement, social support: patient lives at home with his fibrittanie  Reports good support from father, brother, and fiancee  Occupational History: employed at [employer] and [employer]  Functioning Relationships: good support system    Other Pertinent History: no legal, traumatic, or  history    Traumatic History:     Abuse: denies  Other Traumatic Events: death of sister from cancer years ago    Past Medical History:   Diagnosis Date    Anxiety     Depression     High cholesterol     Hypertension     Insomnia     Lyme disease        Mental Status Evaluation and Medical ROS     Medical Review Of Systems:  Review of Systems - Negative except as mentioned in HPI    Meds/Allergies   all current active meds have been reviewed, current meds:   Current Facility-Administered Medications   Medication Dose Route Frequency    hydrOXYzine HCL (ATARAX) tablet 25 mg  25 mg Oral BID    mirtazapine (REMERON) tablet 15 mg  15 mg Oral HS    [START ON 4/27/2021] sertraline (ZOLOFT) tablet 150 mg  150 mg Oral Daily    and PTA meds:   Prior to Admission Medications   Prescriptions Last Dose Informant Patient Reported? Taking? Sertraline HCl (ZOLOFT PO)  Spouse/Significant Other Yes No   Sig: Take 150 tablets by mouth daily at bedtime   hydrOXYzine HCL (ATARAX) 25 mg tablet 4/26/2021 at Unknown time Spouse/Significant Other Yes Yes   Sig: Take 50 mg by mouth daily at bedtime    lisinopril (ZESTRIL) 10 mg tablet 4/26/2021 at Unknown time  Yes Yes   Sig: Take 10 mg by mouth daily   simvastatin (ZOCOR) 40 mg tablet   Yes No   Sig: Take 40 mg by mouth daily with dinner      Facility-Administered Medications: None     No Known Allergies    Objective   Vital signs in last 24 hours:  Temp:  [96 3 °F (35 7 °C)] 96 3 °F (35 7 °C)  HR:  [107] 107  Resp:  [16] 16  BP: (124)/(79) 124/79    No intake or output data in the 24 hours ending 04/26/21 1731    Mental Status Evaluation:  Appearance:  age appropriate white male, dressed in hospital gown  NAD  Behavior:  restless and fidgety, indecisive   Speech:  spontaneous  Normal rate and volume   Mood:  "Anxious"   Affect:  Anxious   Language: naming objects   Thought Process:  goal directed   Associations: intact associations   Thought Content:  No delusions  Perseverative on prior same-sex sexual experience  Perceptual Disturbances: No AVH  Does not appear to be responding to internal stimuli     Risk Potential: Suicidal Ideations none, Homicidal Ideations none and Potential for Aggression No   Sensorium:  person, place and time/date   Memory:  recent and remote memory grossly intact   Cognition:  recent and remote memory grossly intact   Consciousness:  alert and awake    Attention: attention span and concentration were age appropriate   Intellect: not examined   Fund of Knowledge: awareness of current events: COVID-19   Insight:  limited   Judgment: limited   Muscle Strength and Tone: Within normal limits   Gait/Station: normal gait/station   Motor Activity: no abnormal movements     Lab Results:   I have personally reviewed all pertinent laboratory/tests results    Most Recent Labs:   Lab Results   Component Value Date    WBC 4 50 04/26/2021    RBC 4 22 (L) 04/26/2021    HGB 13 7 04/26/2021    HCT 41 2 04/26/2021     04/26/2021    RDW 13 0 04/26/2021    NEUTROABS 3 50 04/12/2021    SODIUM 139 04/26/2021    K 4 1 04/26/2021     04/26/2021    CO2 32 (H) 04/26/2021    BUN 15 04/26/2021    CREATININE 0 83 04/26/2021    GLUC 77 04/26/2021    CALCIUM 9 3 04/26/2021    AST 24 04/26/2021    ALT 20 04/26/2021    ALKPHOS 41 (L) 04/26/2021    TP 6 7 04/26/2021    ALB 4 0 04/26/2021    TBILI 0 79 04/26/2021    [id number] 1 660 04/26/2021       Millie Stanton MD    Psychiatry Resident, PGY-I

## 2021-04-26 NOTE — ED NOTES
Patient remains cooperative  No signs or symptoms of distress noted        Carey Us RN  04/26/21 2052

## 2021-04-26 NOTE — ED PROVIDER NOTES
History  Chief Complaint   Patient presents with    Psychiatric Evaluation     States 4/21/21 just got out of therapy and did good for 2 days; now not sleeping and not "doing well"; states thoughts again of it would be nice to just go to sleep and not wake up  History provided by:  Patient  Psychiatric Evaluation  Presenting symptoms: depression    Presenting symptoms: no suicidal thoughts    Degree of incapacity (severity): Moderate  Onset quality:  Gradual  Timing:  Constant  Progression:  Worsening  Chronicity:  New  Treatment compliance: All of the time  Relieved by:  Nothing  Worsened by:  Nothing  Ineffective treatments:  None tried  Associated symptoms: anhedonia, anxiety and feelings of worthlessness    Associated symptoms: no abdominal pain, no chest pain and no headaches        Prior to Admission Medications   Prescriptions Last Dose Informant Patient Reported? Taking? Sertraline HCl (ZOLOFT PO) 4/26/2021 at Unknown time Spouse/Significant Other Yes Yes   Sig: Take 100 tablets by mouth daily    hydrOXYzine HCL (ATARAX) 25 mg tablet 4/25/2021 at Unknown time Spouse/Significant Other Yes Yes   Sig: Take 50 mg by mouth daily at bedtime    lisinopril (ZESTRIL) 10 mg tablet 4/26/2021 at Unknown time  Yes Yes   Sig: Take 10 mg by mouth daily   simvastatin (ZOCOR) 40 mg tablet 4/26/2021 at Unknown time  Yes Yes   Sig: Take 40 mg by mouth daily with dinner      Facility-Administered Medications: None       Past Medical History:   Diagnosis Date    Anxiety     Depression     High cholesterol     Hypertension     Insomnia     Lyme disease        Past Surgical History:   Procedure Laterality Date    KNEE SURGERY         History reviewed  No pertinent family history  I have reviewed and agree with the history as documented      E-Cigarette/Vaping    E-Cigarette Use Never User      E-Cigarette/Vaping Substances     Social History     Tobacco Use    Smoking status: Former Smoker    Smokeless tobacco: Never Used   Substance Use Topics    Alcohol use: Not Currently    Drug use: Never       Review of Systems   Constitutional: Negative for chills and fever  HENT: Negative for rhinorrhea, sore throat and trouble swallowing  Eyes: Negative for pain  Respiratory: Negative for cough, shortness of breath, wheezing and stridor  Cardiovascular: Negative for chest pain and leg swelling  Gastrointestinal: Negative for abdominal pain, diarrhea and nausea  Endocrine: Negative for polyuria  Genitourinary: Negative for dysuria, flank pain and urgency  Musculoskeletal: Negative for joint swelling, myalgias and neck stiffness  Skin: Negative for rash  Allergic/Immunologic: Negative for immunocompromised state  Neurological: Negative for dizziness, syncope, weakness, numbness and headaches  Psychiatric/Behavioral: Negative for confusion and suicidal ideas  The patient is nervous/anxious  All other systems reviewed and are negative  Physical Exam  Physical Exam  Vitals signs and nursing note reviewed  Constitutional:       Appearance: He is well-developed  HENT:      Head: Normocephalic and atraumatic  Eyes:      Pupils: Pupils are equal, round, and reactive to light  Neck:      Musculoskeletal: Normal range of motion and neck supple  Cardiovascular:      Rate and Rhythm: Normal rate and regular rhythm  Heart sounds: Normal heart sounds  No murmur  No friction rub  Pulmonary:      Effort: Pulmonary effort is normal  No respiratory distress  Breath sounds: No wheezing or rales  Abdominal:      General: Bowel sounds are normal       Palpations: Abdomen is soft  There is no mass  Tenderness: There is no abdominal tenderness  There is no guarding  Skin:     General: Skin is warm  Findings: No rash  Neurological:      Mental Status: He is alert and oriented to person, place, and time  Psychiatric:         Mood and Affect: Mood is depressed   Affect is blunt          Behavior: Behavior is withdrawn  Vital Signs  ED Triage Vitals [04/26/21 1405]   Temperature Pulse Respirations Blood Pressure SpO2   (!) 96 3 °F (35 7 °C) (!) 107 16 124/79 97 %      Temp Source Heart Rate Source Patient Position - Orthostatic VS BP Location FiO2 (%)   Tympanic Monitor Sitting Left arm --      Pain Score       --           Vitals:    04/26/21 1405 04/26/21 1920   BP: 124/79 135/79   Pulse: (!) 107 77   Patient Position - Orthostatic VS: Sitting Sitting         Visual Acuity      ED Medications  Medications   mirtazapine (REMERON) tablet 15 mg (has no administration in time range)   sertraline (ZOLOFT) tablet 150 mg (has no administration in time range)   hydrOXYzine HCL (ATARAX) tablet 25 mg (25 mg Oral Given 4/26/21 1827)       Diagnostic Studies  Results Reviewed     Procedure Component Value Units Date/Time    Manual Differential (Non Wam) [[id number]]  (Abnormal) Collected: 04/26/21 1558    Lab Status: Final result Specimen: Blood from Arm, Left Updated: 04/26/21 1737     Segmented % 71 %      Lymphocytes % 17 %      Monocytes % 9 %      Eosinophils, % 2 %      Basophils % 1 %      Neutrophils Absolute 3 20 Thousand/uL      Lymphocytes Absolute 0 77 Thousand/uL      Monocytes Absolute 0 41 Thousand/uL      Eosinophils Absolute 0 09 Thousand/uL      Basophils Absolute 0 05 Thousand/uL      Total Counted 100     RBC Morphology Normal     Platelet Estimate Adequate     Large Platelet Present    TSH [[id number]]  (Normal) Collected: 04/26/21 1558    Lab Status: Final result Specimen: Blood from Arm, Left Updated: 04/26/21 1707     TSH 3RD GENERATON 1 660 uIU/mL     Narrative:      Patients undergoing fluorescein dye angiography may retain small amounts of fluorescein in the body for 48-72 hours post procedure  Samples containing fluorescein can produce falsely depressed TSH values  If the patient had this procedure,a specimen should be resubmitted post fluorescein clearance  Comprehensive metabolic panel [[id number]]  (Abnormal) Collected: 04/26/21 1558    Lab Status: Final result Specimen: Blood from Arm, Left Updated: 04/26/21 1638     Sodium 139 mmol/L      Potassium 4 1 mmol/L      Chloride 102 mmol/L      CO2 32 mmol/L      ANION GAP 5 mmol/L      BUN 15 mg/dL      Creatinine 0 83 mg/dL      Glucose 77 mg/dL      Calcium 9 3 mg/dL      AST 24 U/L      ALT 20 U/L      Alkaline Phosphatase 41 U/L      Total Protein 6 7 g/dL      Albumin 4 0 g/dL      Total Bilirubin 0 79 mg/dL      eGFR 94 ml/min/1 73sq m     Narrative:      National Kidney Disease Foundation guidelines for Chronic Kidney Disease (CKD):     Stage 1 with normal or high GFR (GFR > 90 mL/min/1 73 square meters)    Stage 2 Mild CKD (GFR = 60-89 mL/min/1 73 square meters)    Stage 3A Moderate CKD (GFR = 45-59 mL/min/1 73 square meters)    Stage 3B Moderate CKD (GFR = 30-44 mL/min/1 73 square meters)    Stage 4 Severe CKD (GFR = 15-29 mL/min/1 73 square meters)    Stage 5 End Stage CKD (GFR <15 mL/min/1 73 square meters)  Note: GFR calculation is accurate only with a steady state creatinine    CBC and differential [[id number]]  (Abnormal) Collected: 04/26/21 1558    Lab Status: Final result Specimen: Blood from Arm, Left Updated: 04/26/21 1621     WBC 4 50 Thousand/uL      RBC 4 22 Million/uL      Hemoglobin 13 7 g/dL      Hematocrit 41 2 %      MCV 97 fL      MCH 32 5 pg      MCHC 33 4 g/dL      RDW 13 0 %      MPV 7 9 fL      Platelets 604 Thousands/uL     Rapid drug screen, urine [[id number]]  (Normal) Collected: 04/26/21 1545    Lab Status: Final result Specimen: Urine, Clean Catch Updated: 04/26/21 1618     Amph/Meth UR Negative     Barbiturate Ur Negative     Benzodiazepine Urine Negative     Cocaine Urine Negative     Methadone Urine Negative     Opiate Urine Negative     PCP Ur Negative     THC Urine Negative     Oxycodone Urine Negative    Narrative:      FOR MEDICAL PURPOSES ONLY     IF CONFIRMATION NEEDED PLEASE CONTACT THE LAB WITHIN 5 DAYS  Drug Screen Cutoff Levels:  AMPHETAMINE/METHAMPHETAMINES  1000 ng/mL  BARBITURATES     200 ng/mL  BENZODIAZEPINES     200 ng/mL  COCAINE      300 ng/mL  METHADONE      300 ng/mL  OPIATES      300 ng/mL  PHENCYCLIDINE     25 ng/mL  THC       50 ng/mL  OXYCODONE      100 ng/mL    POCT alcohol breath test [[id number]]  (Normal) Resulted: 04/26/21 1545    Lab Status: Final result Updated: 04/26/21 1545     EXTBreath Alcohol 0 000                 No orders to display              Procedures  Procedures         ED Course                             SBIRT 20yo+      Most Recent Value   SBIRT (23 yo +)   In order to provide better care to our patients, we are screening all of our patients for alcohol and drug use  Would it be okay to ask you these screening questions? No Filed at: 04/26/2021 1723                    [facility]  Number of Diagnoses or Management Options  Diagnosis management comments: 5:10 PM spoke with the psych resident, will offer the patient 201   Patient with severe anxiety preventing him from performing normal tasks          Amount and/or Complexity of Data Reviewed  Clinical lab tests: ordered and reviewed  Review and summarize past medical records: yes  Independent visualization of images, tracings, or specimens: yes        Disposition  Final diagnoses:   Encounter for psychological evaluation   Depression     Time reflects when diagnosis was documented in both MDM as applicable and the Disposition within this note     Time User Action Codes Description Comment    4/26/2021  8:40 PM Darol Buerger Add [Z00 8] Encounter for psychological evaluation     4/26/2021  8:40 PM Darol Buerger Add [B51 645] Suicidal ideations     4/26/2021  8:40 PM Darol Buerger Remove [Q81 867] Suicidal ideations     4/26/2021  8:40 PM Darol Buerger Add [F32 9] Depression       ED Disposition     ED Disposition Condition Date/Time Comment    Transfer to [facility] Apr 26, 2021  8:40  [address] should be transferred out to u and has been medically cleared  Follow-up Information    None         Patient's Medications   Discharge Prescriptions    No medications on file     No discharge procedures on file      PDMP Review     None          ED Provider  Electronically Signed by           Allie Velez DO  04/26/21 Via Francois Palmer DO  04/26/21 2040

## 2021-04-26 NOTE — LETTER
[facility] EMERGENCY DEPARTMENT  [address] [facility] [address]  [phone]  Dept: [phone]      EMTALA TRANSFER CONSENT    NAME Elliot Rey                                         1960                              MRN [id number]    I have been informed of my rights regarding examination, treatment, and transfer   by Dr Naif Nolen DO    Benefits: Continuity of care    Risks: Potential for delay in receiving treatment, Potential deterioration of medical condition, Loss of IV, Increased discomfort during transfer, Possible worsening of condition or death during transfer      { ED EMTALA TRANSFER CHOICES:[id number]}    I authorize the performance of emergency medical procedures and treatments upon me in both transit and upon arrival at the receiving facility  Additionally, I authorize the release of any and all medical records to the receiving facility and request they be transported with me, if possible  I understand that the safest mode of transportation during a medical emergency is an ambulance and that the Hospital advocates the use of this mode of transport  Risks of traveling to the receiving facility by car, including absence of medical control, life sustaining equipment, such as oxygen, and medical personnel has been explained to me and I fully understand them  (HILDA CORRECT BOX BELOW)  [x  ]  I consent to the stated transfer and to be transported by ambulance/helicopter  [  ]  I consent to the stated transfer, but refuse transportation by ambulance and accept full responsibility for my transportation by car    I understand the risks of non-ambulance transfers and I exonerate the Hospital and its staff from any deterioration in my condition that results from this refusal     X___________________________________________    DATE  21  TIME________  Signature of patient or legally responsible individual signing on patient behalf RELATIONSHIP TO PATIENT___________self______________          Provider Certification    NAME Shelli Stevenson                                         1960                              MRN [id number]    A medical screening exam was performed on the above named patient  Based on the examination:    Condition Necessitating Transfer The primary encounter diagnosis was Encounter for psychological evaluation  A diagnosis of Depression was also pertinent to this visit  Patient Condition: The patient has been stabilized such that within reasonable medical probability, no material deterioration of the patient condition or the condition of the unborn child(volodymyr) is likely to result from the transfer    Reason for Transfer: No bed available at level of patient's needs    Transfer Requirements: [address], [address] [place] [address]   · Space available and qualified personnel available for treatment as acknowledged by    · Agreed to accept transfer and to provide appropriate medical treatment as acknowledged by       Dr Mac Lozano  · Appropriate medical records of the examination and treatment of the patient are provided at the time of transfer   [address] [facility], [address] _ap______  · Transfer will be performed by qualified personnel from   [address] [place] [address] and appropriate transfer equipment as required, including the use of necessary and appropriate life support measures      Provider Certification: I have examined the patient and explained the following risks and benefits of being transferred/refusing transfer to the patient/family:   inpatient mental health treatment      Based on these reasonable risks and benefits to the patient and/or the unborn child(volodymyr), and based upon the information available at the time of the patients examination, I certify that the medical benefits reasonably to be expected from the provision of appropriate medical treatments at another [facility] facility outweigh the increasing risks, if any, to the individuals medical condition, and in the case of labor to the unborn child, from effecting the transfer      X____________________________________________ DATE 04/27/21        TIME_______      ORIGINAL - SEND TO MEDICAL RECORDS   COPY - SEND WITH PATIENT DURING TRANSFER

## 2021-04-26 NOTE — ED NOTES
Patient brought into ED by his wife who is concerned with patient's ongoing anxiety and fears  Patient admits to stating he wishes he could go to sleep and not wake up  Does not have a plan or actual intent on harming himself  Patient denies HI/AH/VH  Patient unsure if he would like to sign himself in or not  Q7min checks in place  Patient room near nurses station        Leonor Downing RN  04/26/21 2507

## 2021-04-27 VITALS
TEMPERATURE: 96.9 F | HEART RATE: 96 BPM | OXYGEN SATURATION: 99 % | SYSTOLIC BLOOD PRESSURE: 126 MMHG | DIASTOLIC BLOOD PRESSURE: 77 MMHG | RESPIRATION RATE: 18 BRPM | WEIGHT: 182 LBS

## 2021-04-27 LAB
ATRIAL RATE: 68 BPM
P AXIS: 156 DEGREES
PR INTERVAL: 140 MS
QRS AXIS: 229 DEGREES
QRSD INTERVAL: 92 MS
QT INTERVAL: 390 MS
QTC INTERVAL: 414 MS
T WAVE AXIS: 190 DEGREES
VENTRICULAR RATE: 68 BPM

## 2021-04-27 PROCEDURE — 93010 ELECTROCARDIOGRAM REPORT: CPT | Performed by: INTERNAL MEDICINE

## 2021-04-27 PROCEDURE — 99282 EMERGENCY DEPT VISIT SF MDM: CPT | Performed by: PSYCHIATRY & NEUROLOGY

## 2021-04-27 RX ORDER — LISINOPRIL 10 MG/1
10 TABLET ORAL ONCE
Status: COMPLETED | OUTPATIENT
Start: 2021-04-27 | End: 2021-04-27

## 2021-04-27 RX ORDER — LORAZEPAM 1 MG/1
2 TABLET ORAL ONCE
Status: COMPLETED | OUTPATIENT
Start: 2021-04-27 | End: 2021-04-27

## 2021-04-27 RX ADMIN — LISINOPRIL 10 MG: 10 TABLET ORAL at 09:45

## 2021-04-27 RX ADMIN — LORAZEPAM 2 MG: 1 TABLET ORAL at 03:54

## 2021-04-27 RX ADMIN — DIPHENHYDRAMINE HCL 50 MG: 25 TABLET ORAL at 02:31

## 2021-04-27 RX ADMIN — HYDROXYZINE HYDROCHLORIDE 25 MG: 25 TABLET, FILM COATED ORAL at 09:29

## 2021-04-27 RX ADMIN — TRAZODONE HYDROCHLORIDE 150 MG: 100 TABLET ORAL at 02:31

## 2021-04-27 RX ADMIN — SERTRALINE HYDROCHLORIDE 150 MG: 50 TABLET ORAL at 09:28

## 2021-04-27 NOTE — ED NOTES
Insurance Authorization for admission:   [place] [facility]  Phone number: [phone]  Spoke to Alice      3 days approved  Level of care: Inpatient Psych 201  Review on 4/30  Authorization # [id number]  Insurance Authorization for Transportation:  Not needed with [facility] transport  [facility] is aware of precert information

## 2021-04-27 NOTE — ED NOTES
Pt would like to watch TV and doesn't want to try medications right now, pt agreed to see if he was still awake at midnight and if so then he would like to take the benadryl and trazodone        Brian Hu RN  04/26/21 5405

## 2021-04-27 NOTE — EMTALA/ACUTE CARE TRANSFER
[facility] EMERGENCY DEPARTMENT  [address] [facility] [address]  [phone]  Dept: [phone]      EMTALA TRANSFER CONSENT    NAME Last Rey                                         1960                              MRN [id number]    I have been informed of my rights regarding examination, treatment, and transfer   by Dr Shellie Sanchez DO    Benefits: Continuity of care    Risks: Potential for delay in receiving treatment, Potential deterioration of medical condition, Loss of IV, Increased discomfort during transfer, Possible worsening of condition or death during transfer      Consent for Transfer:  I acknowledge that my medical condition has been evaluated and explained to me by the emergency department physician or other qualified medical person and/or my attending physician, who has recommended that I be transferred to the service of  Accepting Physician: Dr Leland Tucker at [address] [place] [address] Name, [address] : [place], [address] [place] [address], [address] [facility]  The above potential benefits of such transfer, the potential risks associated with such transfer, and the probable risks of not being transferred have been explained to me, and I fully understand them  The doctor has explained that, in my case, the benefits of transfer outweigh the risks  I agree to be transferred  I authorize the performance of emergency medical procedures and treatments upon me in both transit and upon arrival at the receiving facility  Additionally, I authorize the release of any and all medical records to the receiving facility and request they be transported with me, if possible  I understand that the safest mode of transportation during a medical emergency is an ambulance and that the Hospital advocates the use of this mode of transport   Risks of traveling to the receiving facility by car, including absence of medical control, life sustaining equipment, such as oxygen, and medical personnel has been explained to me and I fully understand them  (HILDA CORRECT BOX BELOW)  [  ]  I consent to the stated transfer and to be transported by ambulance/helicopter  [  ]  I consent to the stated transfer, but refuse transportation by ambulance and accept full responsibility for my transportation by car  I understand the risks of non-ambulance transfers and I exonerate the Hospital and its staff from any deterioration in my condition that results from this refusal     X___________________________________________    DATE  21  TIME________  Signature of patient or legally responsible individual signing on patient behalf           RELATIONSHIP TO PATIENT_________________________          Provider Certification    NAME Janice Lopez                                         1960                              MRN [id number]    A medical screening exam was performed on the above named patient  Based on the examination:    Condition Necessitating Transfer The primary encounter diagnosis was Encounter for psychological evaluation  A diagnosis of Depression was also pertinent to this visit      Patient Condition: The patient has been stabilized such that within reasonable medical probability, no material deterioration of the patient condition or the condition of the unborn child(volodymyr) is likely to result from the transfer    Reason for Transfer: No bed available at level of patient's needs    Transfer Requirements: [address], [address]   · Space available and qualified personnel available for treatment as acknowledged by    · Agreed to accept transfer and to provide appropriate medical treatment as acknowledged by       Dr Tootie Sims  · Appropriate medical records of the examination and treatment of the patient are provided at the time of transfer   [address] _______  · Transfer will be performed by qualified personnel from    and appropriate transfer equipment as required, including the use of necessary and appropriate life support measures  Provider Certification: I have examined the patient and explained the following risks and benefits of being transferred/refusing transfer to the patient/family:         Based on these reasonable risks and benefits to the patient and/or the unborn child(volodymyr), and based upon the information available at the time of the patients examination, I certify that the medical benefits reasonably to be expected from the provision of appropriate medical treatments at another medical facility outweigh the increasing risks, if any, to the individuals medical condition, and in the case of labor to the unborn child, from effecting the transfer      X____________________________________________ DATE 04/26/21        TIME_______      ORIGINAL - SEND TO MEDICAL RECORDS   COPY - SEND WITH PATIENT DURING TRANSFER

## 2021-04-27 NOTE — ED NOTES
Patient is accepted at [address] [facility]  Patient is accepted by Dr Dr Stephanie Menezes per MYNOR in admissions  Transportation is arranged with TBD  Transportation is scheduled for TBD  Patient may go to the floor at anytime once insurance authorization is obtained  Nurse report is to be called to [phone] prior to patient transfer (to be completed close to the time of transportation)

## 2021-04-27 NOTE — ED NOTES
Pt resting on stretcher, no visible signs of pain or distress noted  Q7min checks continued        Fawad Call, RN  04/26/21 3631

## 2021-04-27 NOTE — ED NOTES
Eating breakfast; voiced understanding of  at [facility], [address] [facility]  04/27/21 4024

## 2021-04-27 NOTE — ED NOTES
Pt resting on stretcher, no visible signs of pain or distress noted  Q7min checks continued, lights dimmed in room and pt given two blankets as per requested        Russell Reed RN  04/26/21 3545

## 2021-04-27 NOTE — ED NOTES
[facility] to transport Pt to [facility] at [address] admissions aware of ETA

## 2021-04-27 NOTE — ED NOTES
Pt is sleeping and appears to be in no distress on a q7  safety check        Joel Cavanaugh RN  04/27/21 1535

## 2021-04-27 NOTE — ED NOTES
Pt resting on stretcher, talking on unit phone  No visible signs of pain or distress noted  Q7min checks continued        Sean Clark RN  04/26/21 0479

## 2021-04-27 NOTE — ED NOTES
Pt appears to be sleeping on stretcher, no visible signs of pain or distress noted  Q7min checks continued        Usman Brantley RN  04/27/21 7897

## 2021-04-27 NOTE — ED NOTES
No beds currently available within [facility]  Bed search initiated:    [facility]- bed available  [facility] bed available  [place]- bed available  [facility]- no beds  [facility]- no beds  [facility]- potential dorinda bed in [place]- no beds  [facility]- bed available      Clinical faxed to [facility], [facility], [facility], GEOFF [facility], and [facility] for review  Bed search to continue on 3rd shift if needed

## 2021-04-27 NOTE — ED NOTES
Pt requested something to help him sleep, Provider made aware        Kush Tamez, TAMRA  04/26/21 0742

## 2021-04-27 NOTE — PROGRESS NOTES
Progress Note - [address] 57 y o  male MRN: [id number]  Unit/Bed#: ED 10 Encounter: [id number]      Behavior over the last 24 hours:  Improved  No acute events overnight  Subjective:  Patient is seen for continuity of care  He reports that he feels "better" today and reports slight decrease in anxiety levels  Reports it at 7/10 today  He says that he had difficulty falling asleep until midnight, but was able to sleep from 12 - 2 AM and from 3 AM to 9 AM  Denies SI/HI/AVH  He is looking forward to inpatient treatment  Sleep: normal  Appetite: normal  Medication side effects: No  ROS: negative unless stated in Subjective    Mental Status Evaluation:  Appearance:  age appropriate white male dressed in hospital gown  NAD  Behavior:  calm, cooperative  Does not appear restless  Speech:  normal pitch and normal volume   Mood:  "okay"   Affect:  constricted   Thought Process:  goal directed   Thought Content:  normal   Perceptual Disturbances: No AVH  Does not appear to be RIS  Risk Potential: Suicidal Ideations none, Homicidal Ideations none and Potential for Aggression No   Sensorium:  person, place and time/date   Cognition:  recent and remote memory grossly intact   Consciousness:  alert and awake    Attention: attention span and concentration were age appropriate   Insight:  limited   Judgment: limited   Gait/Station: normal gait/station   Motor Activity: no abnormal movements     Recommended Treatment: continue inpatient hospitalization    Risks, benefits and possible side effects of Medications:   Risks, benefits, and possible side effects of medications explained to patient and patient verbalizes understanding        Medications:   all current active meds have been reviewed, continue current psychiatric medications and current meds:   Current Facility-Administered Medications   Medication Dose Route Frequency    hydrOXYzine HCL (ATARAX) tablet 25 mg  25 mg Oral BID    mirtazapine (REMERON) tablet 15 mg  15 mg Oral HS    sertraline (ZOLOFT) tablet 150 mg  150 mg Oral Daily     Labs: I have personally reviewed all pertinent laboratory/tests results  Most Recent Labs:   Lab Results   Component Value Date    WBC 4 50 04/26/2021    RBC 4 22 (L) 04/26/2021    HGB 13 7 04/26/2021    HCT 41 2 04/26/2021     04/26/2021    RDW 13 0 04/26/2021    NEUTROABS 3 20 04/26/2021    SODIUM 139 04/26/2021    K 4 1 04/26/2021     04/26/2021    CO2 32 (H) 04/26/2021    BUN 15 04/26/2021    CREATININE 0 83 04/26/2021    GLUC 77 04/26/2021    CALCIUM 9 3 04/26/2021    AST 24 04/26/2021    ALT 20 04/26/2021    ALKPHOS 41 (L) 04/26/2021    TP 6 7 04/26/2021    ALB 4 0 04/26/2021    TBILI 0 79 04/26/2021    JKW5EBCLWPNP 1 660 04/26/2021       Assessment/Plan   Principal Problem:    Anxiety disorder, unspecified  Active Problems:    Insomnia      Plan:  1  201 signed  Patient will be transported to [facility] at 10 AM   2  Continue sertraline 150 mg, hydroxyzine 25 mg bid, mirtazapine 15 mg qhs  3  [facility] will sign off at this time  Please reach out via [employer] with any questions

## 2021-04-27 NOTE — ED NOTES
[facility] ED- call back in AM for transport availability; no overnight availability  [address] left  [facility]- voicemail left with answering service  [facility]- no availability  [facility]- no BLS availability   [facility]- no availability      Spoke with Elijah lopez at [employer] to place Pt on [facility] call back list for AM if earlier transport is not secured  [facility] to be updated on ETA once secured

## 2021-05-25 LAB — EXT SARS-COV-2: NOT DETECTED

## 2021-11-12 ENCOUNTER — TELEPHONE (OUTPATIENT)
Dept: OTHER | Facility: OTHER | Age: 61
End: 2021-11-12

## 2021-11-12 ENCOUNTER — NURSE TRIAGE (OUTPATIENT)
Dept: OTHER | Facility: OTHER | Age: 61
End: 2021-11-12

## 2021-11-12 DIAGNOSIS — Z20.828 SARS-ASSOCIATED CORONAVIRUS EXPOSURE: Primary | ICD-10-CM

## 2021-11-12 LAB — SARS-COV-2 RNA RESP QL NAA+PROBE: NEGATIVE

## 2021-11-12 PROCEDURE — U0003 INFECTIOUS AGENT DETECTION BY NUCLEIC ACID (DNA OR RNA); SEVERE ACUTE RESPIRATORY SYNDROME CORONAVIRUS 2 (SARS-COV-2) (CORONAVIRUS DISEASE [COVID-19]), AMPLIFIED PROBE TECHNIQUE, MAKING USE OF HIGH THROUGHPUT TECHNOLOGIES AS DESCRIBED BY CMS-2020-01-R: HCPCS | Performed by: FAMILY MEDICINE

## 2021-11-12 PROCEDURE — U0005 INFEC AGEN DETEC AMPLI PROBE: HCPCS | Performed by: FAMILY MEDICINE

## 2021-12-18 ENCOUNTER — IMMUNIZATIONS (OUTPATIENT)
Dept: FAMILY MEDICINE CLINIC | Facility: HOSPITAL | Age: 61
End: 2021-12-18

## 2021-12-18 DIAGNOSIS — Z23 ENCOUNTER FOR IMMUNIZATION: Primary | ICD-10-CM

## 2021-12-18 PROCEDURE — 91300 COVID-19 PFIZER VACC 0.3 ML: CPT

## 2021-12-18 PROCEDURE — 0001A COVID-19 PFIZER VACC 0.3 ML: CPT

## 2022-01-17 ENCOUNTER — IMMUNIZATIONS (OUTPATIENT)
Dept: FAMILY MEDICINE CLINIC | Facility: HOSPITAL | Age: 62
End: 2022-01-17

## 2022-01-17 DIAGNOSIS — Z23 ENCOUNTER FOR IMMUNIZATION: Primary | ICD-10-CM

## 2022-01-17 PROCEDURE — 91300 COVID-19 PFIZER VACC 0.3 ML: CPT

## 2022-01-17 PROCEDURE — 0001A COVID-19 PFIZER VACC 0.3 ML: CPT

## 2022-07-01 ENCOUNTER — TELEPHONE (OUTPATIENT)
Dept: OTHER | Facility: OTHER | Age: 62
End: 2022-07-01

## 2023-09-14 NOTE — ED NOTES
Safety breakfast provided       Ismael Moss RN  04/27/21 1981 [employer] Counseling:  I discussed with the patient the risks of mycophenolate mofetil including but not limited to infection/immunosuppression, GI upset, hypokalemia, hypercholesterolemia, bone marrow suppression, lymphoproliferative disorders, malignancy, GI ulceration/bleed/perforation, colitis, interstitial lung disease, kidney failure, progressive multifocal leukoencephalopathy, and birth defects.  The patient understands that monitoring is required including a baseline creatinine and regular CBC testing. In addition, patient must alert us immediately if symptoms of infection or other concerning signs are noted.

## 2025-02-17 NOTE — TELEPHONE ENCOUNTER
Patient of Dr Andrea Vizcaino - wife called in to request a sleep apnea test to be ordered for the patient   His eye doctor would like for him to have that exam done to rule out the cause for his recurring eye infection show